# Patient Record
Sex: FEMALE | Race: WHITE | NOT HISPANIC OR LATINO | Employment: UNEMPLOYED | URBAN - METROPOLITAN AREA
[De-identification: names, ages, dates, MRNs, and addresses within clinical notes are randomized per-mention and may not be internally consistent; named-entity substitution may affect disease eponyms.]

---

## 2024-06-13 ENCOUNTER — HOSPITAL ENCOUNTER (EMERGENCY)
Facility: MEDICAL CENTER | Age: 74
End: 2024-06-13
Attending: EMERGENCY MEDICINE
Payer: MEDICARE

## 2024-06-13 ENCOUNTER — APPOINTMENT (OUTPATIENT)
Dept: RADIOLOGY | Facility: MEDICAL CENTER | Age: 74
End: 2024-06-13
Attending: EMERGENCY MEDICINE
Payer: MEDICARE

## 2024-06-13 VITALS
HEART RATE: 62 BPM | WEIGHT: 147 LBS | RESPIRATION RATE: 15 BRPM | OXYGEN SATURATION: 95 % | DIASTOLIC BLOOD PRESSURE: 58 MMHG | SYSTOLIC BLOOD PRESSURE: 124 MMHG | HEIGHT: 67 IN | TEMPERATURE: 98.9 F | BODY MASS INDEX: 23.07 KG/M2

## 2024-06-13 DIAGNOSIS — R41.0 DISORIENTATION: ICD-10-CM

## 2024-06-13 LAB
ALBUMIN SERPL BCP-MCNC: 4 G/DL (ref 3.2–4.9)
ALBUMIN/GLOB SERPL: 1.6 G/DL
ALP SERPL-CCNC: 65 U/L (ref 30–99)
ALT SERPL-CCNC: 10 U/L (ref 2–50)
AMORPH CRY #/AREA URNS HPF: PRESENT /HPF
ANION GAP SERPL CALC-SCNC: 12 MMOL/L (ref 7–16)
APPEARANCE UR: CLEAR
AST SERPL-CCNC: 18 U/L (ref 12–45)
BACTERIA #/AREA URNS HPF: ABNORMAL /HPF
BASOPHILS # BLD AUTO: 0.8 % (ref 0–1.8)
BASOPHILS # BLD: 0.03 K/UL (ref 0–0.12)
BILIRUB SERPL-MCNC: 0.3 MG/DL (ref 0.1–1.5)
BILIRUB UR QL STRIP.AUTO: NEGATIVE
BUN SERPL-MCNC: 13 MG/DL (ref 8–22)
CALCIUM ALBUM COR SERPL-MCNC: 8.9 MG/DL (ref 8.5–10.5)
CALCIUM SERPL-MCNC: 8.9 MG/DL (ref 8.5–10.5)
CHLORIDE SERPL-SCNC: 107 MMOL/L (ref 96–112)
CO2 SERPL-SCNC: 23 MMOL/L (ref 20–33)
COLOR UR: YELLOW
CREAT SERPL-MCNC: 0.83 MG/DL (ref 0.5–1.4)
EOSINOPHIL # BLD AUTO: 0.02 K/UL (ref 0–0.51)
EOSINOPHIL NFR BLD: 0.5 % (ref 0–6.9)
ERYTHROCYTE [DISTWIDTH] IN BLOOD BY AUTOMATED COUNT: 52.7 FL (ref 35.9–50)
GFR SERPLBLD CREATININE-BSD FMLA CKD-EPI: 74 ML/MIN/1.73 M 2
GLOBULIN SER CALC-MCNC: 2.5 G/DL (ref 1.9–3.5)
GLUCOSE SERPL-MCNC: 103 MG/DL (ref 65–99)
GLUCOSE UR STRIP.AUTO-MCNC: NEGATIVE MG/DL
HCT VFR BLD AUTO: 35.8 % (ref 37–47)
HGB BLD-MCNC: 12 G/DL (ref 12–16)
HYALINE CASTS #/AREA URNS LPF: ABNORMAL /LPF
IMM GRANULOCYTES # BLD AUTO: 0.01 K/UL (ref 0–0.11)
IMM GRANULOCYTES NFR BLD AUTO: 0.3 % (ref 0–0.9)
KETONES UR STRIP.AUTO-MCNC: ABNORMAL MG/DL
LACTATE SERPL-SCNC: 1.5 MMOL/L (ref 0.5–2)
LEUKOCYTE ESTERASE UR QL STRIP.AUTO: ABNORMAL
LYMPHOCYTES # BLD AUTO: 0.95 K/UL (ref 1–4.8)
LYMPHOCYTES NFR BLD: 26 % (ref 22–41)
MCH RBC QN AUTO: 30 PG (ref 27–33)
MCHC RBC AUTO-ENTMCNC: 33.5 G/DL (ref 32.2–35.5)
MCV RBC AUTO: 89.5 FL (ref 81.4–97.8)
MICRO URNS: ABNORMAL
MONOCYTES # BLD AUTO: 0.39 K/UL (ref 0–0.85)
MONOCYTES NFR BLD AUTO: 10.7 % (ref 0–13.4)
NEUTROPHILS # BLD AUTO: 2.25 K/UL (ref 1.82–7.42)
NEUTROPHILS NFR BLD: 61.7 % (ref 44–72)
NITRITE UR QL STRIP.AUTO: NEGATIVE
NRBC # BLD AUTO: 0 K/UL
NRBC BLD-RTO: 0 /100 WBC (ref 0–0.2)
PH UR STRIP.AUTO: 7 [PH] (ref 5–8)
PLATELET # BLD AUTO: 241 K/UL (ref 164–446)
PMV BLD AUTO: 9.2 FL (ref 9–12.9)
POTASSIUM SERPL-SCNC: 3.5 MMOL/L (ref 3.6–5.5)
PROT SERPL-MCNC: 6.5 G/DL (ref 6–8.2)
PROT UR QL STRIP: NEGATIVE MG/DL
RBC # BLD AUTO: 4 M/UL (ref 4.2–5.4)
RBC # URNS HPF: ABNORMAL /HPF
RBC UR QL AUTO: NEGATIVE
SODIUM SERPL-SCNC: 142 MMOL/L (ref 135–145)
SP GR UR STRIP.AUTO: 1.02
UROBILINOGEN UR STRIP.AUTO-MCNC: 0.2 MG/DL
WBC # BLD AUTO: 3.7 K/UL (ref 4.8–10.8)
WBC #/AREA URNS HPF: ABNORMAL /HPF

## 2024-06-13 PROCEDURE — 72125 CT NECK SPINE W/O DYE: CPT

## 2024-06-13 PROCEDURE — 36415 COLL VENOUS BLD VENIPUNCTURE: CPT

## 2024-06-13 PROCEDURE — 70486 CT MAXILLOFACIAL W/O DYE: CPT

## 2024-06-13 PROCEDURE — 81001 URINALYSIS AUTO W/SCOPE: CPT

## 2024-06-13 PROCEDURE — 70450 CT HEAD/BRAIN W/O DYE: CPT

## 2024-06-13 PROCEDURE — 85025 COMPLETE CBC W/AUTO DIFF WBC: CPT

## 2024-06-13 PROCEDURE — 99284 EMERGENCY DEPT VISIT MOD MDM: CPT

## 2024-06-13 PROCEDURE — 83605 ASSAY OF LACTIC ACID: CPT

## 2024-06-13 PROCEDURE — 80053 COMPREHEN METABOLIC PANEL: CPT

## 2024-06-13 RX ORDER — QUETIAPINE FUMARATE 50 MG/1
50 TABLET, FILM COATED ORAL
COMMUNITY
Start: 2024-05-21

## 2024-06-13 RX ORDER — ERGOCALCIFEROL 1.25 MG/1
50000 CAPSULE ORAL
COMMUNITY
Start: 2024-05-30

## 2024-06-13 RX ORDER — LAMOTRIGINE 150 MG/1
300 TABLET ORAL
COMMUNITY
Start: 2024-05-21

## 2024-06-13 RX ORDER — SERTRALINE HYDROCHLORIDE 100 MG/1
150 TABLET, FILM COATED ORAL EVERY MORNING
COMMUNITY
Start: 2024-05-31

## 2024-06-13 RX ORDER — ARIPIPRAZOLE 2 MG/1
2 TABLET ORAL EVERY MORNING
COMMUNITY
Start: 2024-05-21

## 2024-06-13 RX ORDER — VALPROIC ACID 250 MG/1
250 CAPSULE, LIQUID FILLED ORAL EVERY EVENING
COMMUNITY

## 2024-06-13 ASSESSMENT — PAIN DESCRIPTION - PAIN TYPE: TYPE: ACUTE PAIN

## 2024-06-13 NOTE — ED TRIAGE NOTES
"Christi Oliveira  74 y.o.  female  [facility] for     Chief Complaint   Patient presents with    ALOC    T-5000 FALL    Abrasion     Per report, pt is from [place] where she lives independently in an apartment.  Pt ended up in [place] today by car but ran out of gas in her car and started walking.  EMS reports 911 called because patient was standing on the freeway talking to a construction cone.  Pt has reported hx of bipolar and alzheimer's dementia.  Pt's emergency contact is Juanita at [phone].  Juanita reported patient takes zoloft, abilify, valproic acid and lamotrigine and she saw patient take it last night.  EMS reports patient talkative in route but much of it did not make sense.  Pt rambling on arrival to ED.  Pt placed on bed alarm, locating functioning alarm box.  No interventions by EMS pta, fsbs 119.  Pt reports she fell 3 days ago and has abrasions to R lateral forehead/eye, tip of nose, and bilat knees.  /69   Pulse 74   Temp 37.2 °C (99 °F) (Oral)   Resp 16   Ht 1.702 m (5' 7\")   Wt 66.7 kg (147 lb)   SpO2 93%   BMI 23.02 kg/m²     "

## 2024-06-13 NOTE — ED PROVIDER NOTES
"ED Provider Note  CHIEF COMPLAINT  Chief Complaint   Patient presents with    ALOC    T-5000 FALL    Abrasion       HPI  Christi Brandee Oliveira is a 74 y.o. female who presents for evaluation of what appears to be a ground-level fall.  Patient states she fell \"flat on my face\" but cannot tell me how.  She does note she lives in [facility] and drove here to see her sister.  She does not know what city she is in know the date and is unclear about the situation.  She is very pleasant and smiling however.  She has a small abrasion to the right side of her eye with some ecchymosis in the same general area as well.  EMS noted that the patient is medicated for bipolar with valproic acid but also has Alzheimer's.    EXTERNAL RECORDS REVIEWED  Reviewed last ED encounter March 2024 for rash thought to be a flea bite.  ROS  Constitutional: No fevers or chills  Skin: Abrasion to right periorbital region.  HEENT: No sore throat, or runny nose  Neck: No neck pain  Chest: No pain or rashes  Pulm: No shortness of breath, cough, wheezing, stridor, or pain with inspiration/expiration  Gastrointestinal: No nausea, vomiting, diarrhea,  or abdominal pain.  Genitourinary: No dysuria or hematuria  Musculoskeletal: No pain, swelling, or focal weakness  Neurologic: No sensory or focal motor changes to extremities.  Disoriented          LIMITATION TO HISTORY   Select: Altered mental status / Confusion  OUTSIDE HISTORIAN(S):  EMS          PAST FAM HISTORY  No family history on file.    PAST MEDICAL HISTORY   has a past medical history of Dental caries.    SOCIAL HISTORY  Social History     Tobacco Use    Smoking status: Never    Smokeless tobacco: Not on file   Substance and Sexual Activity    Alcohol use: Not on file    Drug use: Not on file    Sexual activity: Not on file       SURGICAL HISTORY  patient denies any surgical history    CURRENT MEDICATIONS  Home Medications       Reviewed by Quintin Mora (Pharmacy Tech) on " "06/13/24 at 1219  Med List Status: Complete     Medication Last Dose Status   ARIPiprazole (ABILIFY) 2 MG tablet 6/12/2024 Active   lamotrigine (LAMICTAL) 150 MG tablet 6/12/2024 Active   QUEtiapine (SEROQUEL) 50 MG tablet UNK Active   sertraline (ZOLOFT) 100 MG Tab 6/12/2024 Active   valproic acid (DEPAKENE) 250 MG Cap 6/12/2024 Active   vitamin D2, Ergocalciferol, (DRISDOL) 1.25 MG (87614 UT) Cap capsule UNK Active                  Audit from Redirected Encounters    **Home medications have not yet been reviewed for this encounter**          ALLERGIES  Allergies   Allergen Reactions    Penicillins Hives     Convulsions       PHYSICAL EXAM  VITAL SIGNS: /65   Pulse 76   Temp 37.2 °C (99 °F) (Oral)   Resp 14   Ht 1.702 m (5' 7\")   Wt 66.7 kg (147 lb)   SpO2 94%   BMI 23.02 kg/m²    Gen: Appears tired, disheveled, but pleasant and smiling.  Chuckles throughout interview.  HEENT: Small abrasion with irregular area of mild surrounding ecchymosis approximately 2 cm in diameter over the right lateral periorbital region.  PERRLA, EOMI., Bilateral external ears normal, Nose faint abrasion to bridge. Conjunctiva normal, Non-icteric.   Neck:  No tenderness, Supple, No masses  Lymphatic: No cervical lymphadenopathy noted.   Cardiovascular: Regular rate and rhythm.  Capillary refill less than 3 seconds to all extremities, 2+ distal pulses.  Thorax & Lungs: Normal breath sounds, No respiratory distress, No wheezing bilateral chest rise  Abdomen: Bowel sounds normal, Soft, No tenderness, No masses, No pulsatile masses. No Guarding or rebound  Skin: Warm, Dry, No erythema, No rash noted to exposed areas.   Back: No bony tenderness, No CVA tenderness.   Extremities: Intact distal pulses, No edema.  Able to passively range all extremities without distress or limitation.  No tense muscle compartments or signs of trauma.  No pain with axial loading of lower extremities.  Neurologic: Alert , no facial droop, grossly " normal coordination and strength  Psychiatric: Affect pleasant    INITIAL IMPRESSION  Patient arrives for generalized confusion in the setting of what appears to be mechanical trauma after fall.  Patient has a history of Alzheimer's per EMS and also likely is unmedicated in terms of her psychiatric illness.  She is, however, quite pleasant and seems inappropriately jovial.  She is awaiting commands and is moving all extremities.  She does not have any complaints.  CT imaging of the head, cervical spine and maxillofacial region will be obtained, and screening labs will be obtained to include urinalysis.  Will attempt to get in contact with the emergency contact but as her emergency contact lives out of state, it is highly likely she will need at least ED observation if not admission.  If there are no emergent findings by labs or imaging, she will be boarding in the emergency department until she can be picked up by family members.    ED observation?   Yes; I am placing the patient in to an observation status due to a diagnostic uncertainty as well as therapeutic intensity. Patient placed in observation status at 10:30 AM, June 13, 2024  Observation plan is as follows: Labs, imaging, all reevaluation after diagnostics have returned.  Discussion with family members in [place].  LABS  Results for orders placed or performed during the hospital encounter of 06/13/24   CBC WITH DIFFERENTIAL   Result Value Ref Range    WBC 3.7 (L) 4.8 - 10.8 K/uL    RBC 4.00 (L) 4.20 - 5.40 M/uL    Hemoglobin 12.0 12.0 - 16.0 g/dL    Hematocrit 35.8 (L) 37.0 - 47.0 %    MCV 89.5 81.4 - 97.8 fL    MCH 30.0 27.0 - 33.0 pg    MCHC 33.5 32.2 - 35.5 g/dL    RDW 52.7 (H) 35.9 - 50.0 fL    Platelet Count 241 164 - 446 K/uL    MPV 9.2 9.0 - 12.9 fL    Neutrophils-Polys 61.70 44.00 - 72.00 %    Lymphocytes 26.00 22.00 - 41.00 %    Monocytes 10.70 0.00 - 13.40 %    Eosinophils 0.50 0.00 - 6.90 %    Basophils 0.80 0.00 - 1.80 %    Immature  Granulocytes 0.30 0.00 - 0.90 %    Nucleated RBC 0.00 0.00 - 0.20 /100 WBC    Neutrophils (Absolute) 2.25 1.82 - 7.42 K/uL    Lymphs (Absolute) 0.95 (L) 1.00 - 4.80 K/uL    Monos (Absolute) 0.39 0.00 - 0.85 K/uL    Eos (Absolute) 0.02 0.00 - 0.51 K/uL    Baso (Absolute) 0.03 0.00 - 0.12 K/uL    Immature Granulocytes (abs) 0.01 0.00 - 0.11 K/uL    NRBC (Absolute) 0.00 K/uL   COMP METABOLIC PANEL   Result Value Ref Range    Sodium 142 135 - 145 mmol/L    Potassium 3.5 (L) 3.6 - 5.5 mmol/L    Chloride 107 96 - 112 mmol/L    Co2 23 20 - 33 mmol/L    Anion Gap 12.0 7.0 - 16.0    Glucose 103 (H) 65 - 99 mg/dL    Bun 13 8 - 22 mg/dL    Creatinine 0.83 0.50 - 1.40 mg/dL    Calcium 8.9 8.5 - 10.5 mg/dL    Correct Calcium 8.9 8.5 - 10.5 mg/dL    AST(SGOT) 18 12 - 45 U/L    ALT(SGPT) 10 2 - 50 U/L    Alkaline Phosphatase 65 30 - 99 U/L    Total Bilirubin 0.3 0.1 - 1.5 mg/dL    Albumin 4.0 3.2 - 4.9 g/dL    Total Protein 6.5 6.0 - 8.2 g/dL    Globulin 2.5 1.9 - 3.5 g/dL    A-G Ratio 1.6 g/dL   LACTIC ACID   Result Value Ref Range    Lactic Acid 1.5 0.5 - 2.0 mmol/L   URINALYSIS CULTURE, IF INDICATED    Specimen: Urine, Clean Catch   Result Value Ref Range    Color Yellow     Character Clear     Specific Gravity 1.020 <1.035    Ph 7.0 5.0 - 8.0    Glucose Negative Negative mg/dL    Ketones Trace (A) Negative mg/dL    Protein Negative Negative mg/dL    Bilirubin Negative Negative    Urobilinogen, Urine 0.2 Negative    Nitrite Negative Negative    Leukocyte Esterase Trace (A) Negative    Occult Blood Negative Negative    Micro Urine Req Microscopic    ESTIMATED GFR   Result Value Ref Range    GFR (CKD-EPI) 74 >60 mL/min/1.73 m 2   URINE MICROSCOPIC (W/UA)   Result Value Ref Range    WBC 0-2 /hpf    RBC 0-2 /hpf    Bacteria Few (A) None /hpf    Amorphous Crystal Present /hpf    Hyaline Cast 0-2 /lpf     RADIOLOGY  CT-CSPINE WITHOUT PLUS RECONS   Final Result      No evidence of cervical spine fracture.      CT-MAXILLOFACIAL W/O  PLUS RECONS   Final Result      No evidence of facial fracture.      CT-HEAD W/O   Final Result      No evidence of acute intracranial process.             ASSESSMENT, COURSE AND PLAN  Care Narrative: 6:30 PM  Patient's imaging and diagnostic evaluation is otherwise very reassuring and there is no evidence for any emergent issues.  From a medical standpoint she can be discharged provided she has a safe place to go.  Nursing discussed the case with the patient's family member in [place] who will be driving here but will be here for at least 2 more hours.      Upon Reevaluation, the patient's condition has: Remained unchanged and is nonemergent.  She will be discharged once her family member gets to the emergency department.  Patient will be turned over to oncoming physician pending family member arrival and discharge.      Hydration: Based on the patient's presentation of Shaquille GLEASON the patient was given IV fluids. IV Hydration was used because oral hydration was not adequate alone. Upon recheck following hydration, the patient was unchanged.    8:08 PM  Patient reevaluated bedside.  She is calm, conversant, and smiling.  She is in no distress.  She states understanding she needs to wait for family member who should be here within half an hour.  Patient will be discharged from observation at this point and we will await family member to come pick her up.    ADDITIONAL PROBLEMS MANAGED    I have discussed management of the patient with the following physicians and HANS's: None    Escalation of care considered, and ultimately not performed:acute inpatient care management, however at this time, the patient is most appropriate for outpatient management    Barriers to care at this time, including but not limited to: Patient apparently has dementia and does not live in the state..     Decision tools and Rx drugs considered including, but not limited to : None    Discussion of management with other [facility] or appropriate  source(s): None        FINAL IMPRESSION  1. Disorientation        Electronically signed by: Mike Buchanan M.D., 6/13/2024 10:30 AM

## 2024-06-13 NOTE — ED NOTES
Pharmacy Medication Reconciliation      ~Medication reconciliation updated and complete per patient home pharmacy   ~Allergies have been verified with patient home pharmacy   ~No oral ABX within the last 30 days  ~Patient home pharmacy :  [facility], [place]      ~Anticoagulants (rivaroxaban, apixaban, edoxaban, dabigatran, warfarin, enoxaparin) taken in the last 14 days? No

## 2024-06-13 NOTE — ED NOTES
Spoke with Juanita, she is able to come  the pt in a few hours as she has to take an Uber to her vehicle from [place]. Juanita will call when she has an ETA

## 2024-06-13 NOTE — ED NOTES
Bedside report from ABBY Avila. Pt resting in [facility] comfortably, on monitor, call light in reach.  Pt is on RA, GCS 14.  Necessary fall precautions in place. Including bed alarm.

## 2024-06-14 LAB — EXTRA TUBE BLU BLU: NORMAL

## 2024-06-14 NOTE — ED NOTES
Pt discharged to lobby via w/c with friend, Juanita. Pt assisted into vehicle by RN. GCS 15. IV discontinued and gauze placed, pt in possession of belongings. Pt provided discharge education and information pertaining to medications and follow up appointments. Pt received copy of discharge instructions and verbalized understanding.     Vitals:    06/13/24 2000   BP:    Pulse: 62   Resp: 15   Temp: 37.2 °C (98.9 °F)   SpO2: 95%